# Patient Record
Sex: MALE | Race: AMERICAN INDIAN OR ALASKA NATIVE | HISPANIC OR LATINO | Employment: STUDENT
[De-identification: names, ages, dates, MRNs, and addresses within clinical notes are randomized per-mention and may not be internally consistent; named-entity substitution may affect disease eponyms.]

---
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
## 2023-10-09 ENCOUNTER — OFFICE VISIT (OUTPATIENT)
Dept: FAMILY MEDICINE | Age: 18
End: 2023-10-09

## 2023-10-09 VITALS
HEART RATE: 73 BPM | DIASTOLIC BLOOD PRESSURE: 77 MMHG | WEIGHT: 243.83 LBS | SYSTOLIC BLOOD PRESSURE: 120 MMHG | BODY MASS INDEX: 34.91 KG/M2 | HEIGHT: 70 IN | RESPIRATION RATE: 18 BRPM

## 2023-10-09 DIAGNOSIS — E06.3 HYPOTHYROIDISM DUE TO HASHIMOTO'S THYROIDITIS: Primary | ICD-10-CM

## 2023-10-09 DIAGNOSIS — E03.8 HYPOTHYROIDISM DUE TO HASHIMOTO'S THYROIDITIS: Primary | ICD-10-CM

## 2023-10-09 PROCEDURE — 99214 OFFICE O/P EST MOD 30 MIN: CPT | Performed by: FAMILY MEDICINE

## 2023-10-09 ASSESSMENT — ENCOUNTER SYMPTOMS
FEVER: 0
GASTROINTESTINAL NEGATIVE: 1
RESPIRATORY NEGATIVE: 1
CHILLS: 0

## 2023-10-09 ASSESSMENT — PATIENT HEALTH QUESTIONNAIRE - PHQ9
CLINICAL INTERPRETATION OF PHQ2 SCORE: NO FURTHER SCREENING NEEDED
1. LITTLE INTEREST OR PLEASURE IN DOING THINGS: NOT AT ALL
2. FEELING DOWN, DEPRESSED OR HOPELESS: NOT AT ALL
SUM OF ALL RESPONSES TO PHQ9 QUESTIONS 1 AND 2: 0
SUM OF ALL RESPONSES TO PHQ9 QUESTIONS 1 AND 2: 0

## 2023-11-18 ENCOUNTER — WALK IN (OUTPATIENT)
Dept: URGENT CARE | Age: 18
End: 2023-11-18

## 2023-11-18 VITALS
OXYGEN SATURATION: 98 % | TEMPERATURE: 98.3 F | SYSTOLIC BLOOD PRESSURE: 129 MMHG | RESPIRATION RATE: 19 BRPM | WEIGHT: 234 LBS | DIASTOLIC BLOOD PRESSURE: 80 MMHG | BODY MASS INDEX: 33.5 KG/M2 | HEIGHT: 70 IN | HEART RATE: 79 BPM

## 2023-11-18 DIAGNOSIS — H69.92 DYSFUNCTION OF LEFT EUSTACHIAN TUBE: Primary | ICD-10-CM

## 2023-11-18 PROCEDURE — 99213 OFFICE O/P EST LOW 20 MIN: CPT | Performed by: NURSE PRACTITIONER

## 2023-11-18 ASSESSMENT — ENCOUNTER SYMPTOMS
DIZZINESS: 1
COUGH: 0
VOMITING: 0
PHOTOPHOBIA: 0
SHORTNESS OF BREATH: 0
DOUBLE VISION: 0
ABDOMINAL PAIN: 0
BLURRED VISION: 0
DIAPHORESIS: 1
NAUSEA: 0
WHEEZING: 0
SORE THROAT: 0
FEVER: 0
CHILLS: 0
HEADACHES: 0

## 2023-11-21 ENCOUNTER — TELEPHONE (OUTPATIENT)
Dept: URGENT CARE | Age: 18
End: 2023-11-21

## 2023-12-09 ENCOUNTER — WALK IN (OUTPATIENT)
Dept: URGENT CARE | Age: 18
End: 2023-12-09

## 2023-12-09 VITALS
RESPIRATION RATE: 18 BRPM | HEIGHT: 70 IN | DIASTOLIC BLOOD PRESSURE: 84 MMHG | WEIGHT: 224.21 LBS | SYSTOLIC BLOOD PRESSURE: 134 MMHG | HEART RATE: 87 BPM | BODY MASS INDEX: 32.1 KG/M2 | TEMPERATURE: 98 F | OXYGEN SATURATION: 99 %

## 2023-12-09 DIAGNOSIS — L03.032 PARONYCHIA OF GREAT TOE, LEFT: Primary | ICD-10-CM

## 2023-12-09 PROCEDURE — 87070 CULTURE OTHR SPECIMN AEROBIC: CPT | Performed by: CLINICAL MEDICAL LABORATORY

## 2023-12-09 PROCEDURE — 99213 OFFICE O/P EST LOW 20 MIN: CPT | Performed by: NURSE PRACTITIONER

## 2023-12-09 PROCEDURE — 87205 SMEAR GRAM STAIN: CPT | Performed by: CLINICAL MEDICAL LABORATORY

## 2023-12-09 PROCEDURE — 87186 SC STD MICRODIL/AGAR DIL: CPT | Performed by: CLINICAL MEDICAL LABORATORY

## 2023-12-09 PROCEDURE — 87075 CULTR BACTERIA EXCEPT BLOOD: CPT | Performed by: CLINICAL MEDICAL LABORATORY

## 2023-12-09 PROCEDURE — 87077 CULTURE AEROBIC IDENTIFY: CPT | Performed by: CLINICAL MEDICAL LABORATORY

## 2023-12-09 RX ORDER — DOXYCYCLINE HYCLATE 100 MG
100 TABLET ORAL 2 TIMES DAILY
Qty: 14 TABLET | Refills: 0 | Status: SHIPPED | OUTPATIENT
Start: 2023-12-09 | End: 2023-12-16

## 2023-12-09 ASSESSMENT — ENCOUNTER SYMPTOMS
FEVER: 0
CONSTITUTIONAL NEGATIVE: 1
RESPIRATORY NEGATIVE: 1
FATIGUE: 0
CHILLS: 0

## 2023-12-14 ENCOUNTER — TELEPHONE (OUTPATIENT)
Dept: URGENT CARE | Age: 18
End: 2023-12-14

## 2023-12-14 LAB
BACTERIA SPEC ANAEROBE+AEROBE CULT: ABNORMAL
GRAM STN SPEC: ABNORMAL

## 2023-12-23 ENCOUNTER — LAB SERVICES (OUTPATIENT)
Dept: LAB | Age: 18
End: 2023-12-23

## 2023-12-23 DIAGNOSIS — E03.8 HYPOTHYROIDISM DUE TO HASHIMOTO'S THYROIDITIS: ICD-10-CM

## 2023-12-23 DIAGNOSIS — E06.3 HYPOTHYROIDISM DUE TO HASHIMOTO'S THYROIDITIS: ICD-10-CM

## 2023-12-23 LAB
ALBUMIN SERPL-MCNC: 3.9 G/DL (ref 3.6–5.1)
ALBUMIN/GLOB SERPL: 1.1 {RATIO} (ref 1–2.4)
ALP SERPL-CCNC: 86 UNITS/L (ref 45–117)
ALT SERPL-CCNC: 55 UNITS/L
ANION GAP SERPL CALC-SCNC: 11 MMOL/L (ref 7–19)
AST SERPL-CCNC: 38 UNITS/L
BASOPHILS # BLD: 0 K/MCL (ref 0–0.3)
BASOPHILS NFR BLD: 1 %
BILIRUB SERPL-MCNC: 1.1 MG/DL (ref 0.2–1)
BUN SERPL-MCNC: 12 MG/DL (ref 6–20)
BUN/CREAT SERPL: 16 (ref 7–25)
CALCIUM SERPL-MCNC: 9.3 MG/DL (ref 8.4–10.2)
CHLORIDE SERPL-SCNC: 107 MMOL/L (ref 97–110)
CHOLEST SERPL-MCNC: 153 MG/DL
CHOLEST/HDLC SERPL: 3.4 {RATIO}
CO2 SERPL-SCNC: 26 MMOL/L (ref 21–32)
CREAT SERPL-MCNC: 0.75 MG/DL (ref 0.67–1.17)
DEPRECATED RDW RBC: 41.6 FL (ref 39–50)
EGFRCR SERPLBLD CKD-EPI 2021: >90 ML/MIN/{1.73_M2}
EOSINOPHIL # BLD: 0.1 K/MCL (ref 0–0.5)
EOSINOPHIL NFR BLD: 1 %
ERYTHROCYTE [DISTWIDTH] IN BLOOD: 12.7 % (ref 11–15)
FASTING DURATION TIME PATIENT: 12 HOURS (ref 0–999)
GLOBULIN SER-MCNC: 3.4 G/DL (ref 2–4)
GLUCOSE SERPL-MCNC: 90 MG/DL (ref 70–99)
HBA1C MFR BLD: 4.9 % (ref 4.5–5.6)
HCT VFR BLD CALC: 50.3 % (ref 39–51)
HDLC SERPL-MCNC: 45 MG/DL
HGB BLD-MCNC: 16.8 G/DL (ref 13–17)
IMM GRANULOCYTES # BLD AUTO: 0 K/MCL (ref 0–0.2)
IMM GRANULOCYTES # BLD: 0 %
LDLC SERPL CALC-MCNC: 89 MG/DL
LYMPHOCYTES # BLD: 2.3 K/MCL (ref 1.2–5.2)
LYMPHOCYTES NFR BLD: 38 %
MCH RBC QN AUTO: 29.6 PG (ref 26–34)
MCHC RBC AUTO-ENTMCNC: 33.4 G/DL (ref 32–36.5)
MCV RBC AUTO: 88.6 FL (ref 78–100)
MONOCYTES # BLD: 0.5 K/MCL (ref 0.3–0.9)
MONOCYTES NFR BLD: 8 %
NEUTROPHILS # BLD: 3.1 K/MCL (ref 1.8–8)
NEUTROPHILS NFR BLD: 52 %
NONHDLC SERPL-MCNC: 108 MG/DL
NRBC BLD MANUAL-RTO: 0 /100 WBC
PLATELET # BLD AUTO: 328 K/MCL (ref 140–450)
POTASSIUM SERPL-SCNC: 4.1 MMOL/L (ref 3.4–5.1)
PROT SERPL-MCNC: 7.3 G/DL (ref 6.4–8.2)
RBC # BLD: 5.68 MIL/MCL (ref 4.5–5.9)
SODIUM SERPL-SCNC: 140 MMOL/L (ref 135–145)
T4 FREE SERPL-MCNC: 0.9 NG/DL (ref 0.8–1.3)
TRIGL SERPL-MCNC: 97 MG/DL
TSH SERPL-ACNC: 9.9 MCUNITS/ML (ref 0.46–4.13)
WBC # BLD: 6 K/MCL (ref 4.2–11)

## 2023-12-23 PROCEDURE — 84439 ASSAY OF FREE THYROXINE: CPT | Performed by: CLINICAL MEDICAL LABORATORY

## 2023-12-23 PROCEDURE — 80050 GENERAL HEALTH PANEL: CPT | Performed by: CLINICAL MEDICAL LABORATORY

## 2023-12-23 PROCEDURE — 36415 COLL VENOUS BLD VENIPUNCTURE: CPT | Performed by: FAMILY MEDICINE

## 2023-12-23 PROCEDURE — 80061 LIPID PANEL: CPT | Performed by: CLINICAL MEDICAL LABORATORY

## 2023-12-23 PROCEDURE — 86800 THYROGLOBULIN ANTIBODY: CPT | Performed by: CLINICAL MEDICAL LABORATORY

## 2023-12-23 PROCEDURE — 83036 HEMOGLOBIN GLYCOSYLATED A1C: CPT | Performed by: CLINICAL MEDICAL LABORATORY

## 2023-12-23 PROCEDURE — 86376 MICROSOMAL ANTIBODY EACH: CPT | Performed by: CLINICAL MEDICAL LABORATORY

## 2023-12-24 LAB
THYROGLOB AB SERPL-ACNC: 2.6 IU/ML (ref 0–4)
THYROPEROXIDASE AB SERPL-ACNC: 4932 UNITS/ML

## 2023-12-28 RX ORDER — LEVOTHYROXINE SODIUM 0.12 MG/1
125 TABLET ORAL DAILY
Qty: 30 TABLET | Refills: 0 | OUTPATIENT
Start: 2023-12-28

## 2024-01-22 ENCOUNTER — APPOINTMENT (OUTPATIENT)
Dept: FAMILY MEDICINE | Age: 19
End: 2024-01-22

## 2024-03-14 ENCOUNTER — APPOINTMENT (OUTPATIENT)
Dept: FAMILY MEDICINE | Age: 19
End: 2024-03-14

## 2024-03-23 ENCOUNTER — OFFICE VISIT (OUTPATIENT)
Dept: FAMILY MEDICINE | Age: 19
End: 2024-03-23

## 2024-03-23 VITALS
DIASTOLIC BLOOD PRESSURE: 64 MMHG | WEIGHT: 227.07 LBS | BODY MASS INDEX: 32.51 KG/M2 | HEART RATE: 68 BPM | HEIGHT: 70 IN | SYSTOLIC BLOOD PRESSURE: 111 MMHG | RESPIRATION RATE: 18 BRPM | OXYGEN SATURATION: 97 %

## 2024-03-23 DIAGNOSIS — E03.8 HYPOTHYROIDISM DUE TO HASHIMOTO'S THYROIDITIS: Primary | ICD-10-CM

## 2024-03-23 DIAGNOSIS — E06.3 HYPOTHYROIDISM DUE TO HASHIMOTO'S THYROIDITIS: Primary | ICD-10-CM

## 2024-03-23 RX ORDER — LEVOTHYROXINE SODIUM 0.07 MG/1
75 TABLET ORAL DAILY
Qty: 90 TABLET | Refills: 3 | Status: SHIPPED | OUTPATIENT
Start: 2024-03-23

## 2024-03-23 ASSESSMENT — PATIENT HEALTH QUESTIONNAIRE - PHQ9
SUM OF ALL RESPONSES TO PHQ9 QUESTIONS 1 AND 2: 0
1. LITTLE INTEREST OR PLEASURE IN DOING THINGS: NOT AT ALL
SUM OF ALL RESPONSES TO PHQ9 QUESTIONS 1 AND 2: 0
CLINICAL INTERPRETATION OF PHQ2 SCORE: NO FURTHER SCREENING NEEDED
2. FEELING DOWN, DEPRESSED OR HOPELESS: NOT AT ALL

## 2024-03-23 ASSESSMENT — ENCOUNTER SYMPTOMS: RESPIRATORY NEGATIVE: 1

## 2024-08-23 ENCOUNTER — APPOINTMENT (OUTPATIENT)
Dept: ENDOCRINOLOGY | Age: 19
End: 2024-08-23
Attending: FAMILY MEDICINE

## 2025-01-03 ENCOUNTER — HOSPITAL ENCOUNTER (OUTPATIENT)
Age: 20
Discharge: HOME | End: 2025-01-03
Payer: COMMERCIAL

## 2025-01-03 VITALS
OXYGEN SATURATION: 98 % | HEART RATE: 77 BPM | TEMPERATURE: 98 F | DIASTOLIC BLOOD PRESSURE: 82 MMHG | RESPIRATION RATE: 16 BRPM | SYSTOLIC BLOOD PRESSURE: 161 MMHG

## 2025-01-03 DIAGNOSIS — J10.1 INFLUENZA A: Primary | ICD-10-CM

## 2025-01-03 DIAGNOSIS — S69.91XA INJURY TO THUMB (NAIL), RIGHT, INITIAL ENCOUNTER: ICD-10-CM

## 2025-01-03 LAB
POCT INFLUENZA A: POSITIVE
POCT INFLUENZA B: NEGATIVE
S PYO AG THROAT QL: NEGATIVE
SARS-COV-2 RNA RESP QL NAA+PROBE: NOT DETECTED

## 2025-01-03 PROCEDURE — 87880 STREP A ASSAY W/OPTIC: CPT | Performed by: PHYSICIAN ASSISTANT

## 2025-01-03 PROCEDURE — 99214 OFFICE O/P EST MOD 30 MIN: CPT | Performed by: PHYSICIAN ASSISTANT

## 2025-01-03 PROCEDURE — 87502 INFLUENZA DNA AMP PROBE: CPT | Performed by: PHYSICIAN ASSISTANT

## 2025-01-03 PROCEDURE — U0002 COVID-19 LAB TEST NON-CDC: HCPCS | Performed by: PHYSICIAN ASSISTANT

## 2025-01-03 RX ORDER — BENZONATATE 200 MG/1
200 CAPSULE ORAL 3 TIMES DAILY PRN
Qty: 20 CAPSULE | Refills: 0 | Status: SHIPPED | OUTPATIENT
Start: 2025-01-03

## 2025-01-03 RX ORDER — DEXTROMETHORPHAN HBR, GUAIFENESIN 60; 1200 MG/1; MG/1
1 TABLET, EXTENDED RELEASE ORAL EVERY 12 HOURS
Qty: 12 TABLET | Refills: 0 | Status: SHIPPED | OUTPATIENT
Start: 2025-01-03

## 2025-01-04 NOTE — ED PROVIDER NOTES
Chief Complaint   Patient presents with    Cough       History obtained from: patient   services not used    HPI:     Johnathan Reyes is a 19 year old male who presents with general illness symptoms x 3 days.  Patient endorses cough, runny nose, and sore throat.  Patient states coworkers recently sick with similar symptoms.  Patient taking OTC cold medicine.  Patient continues to eat and drink normally.  Denies fever, chills, facial or neck swelling, drooling, voice change, headache, ear pain, chest pain, shortness of breath, vomiting, diarrhea, neck stiffness, rash.    Patient also endorses right thumb injury sustained 3 weeks ago.  Patient states his thumb was smashed with a wrench at work approximately 3 weeks ago.  Patient did not get evaluated following this injury.  Patient states his nail split and is starting to get caught on things and bother him.  Denies swelling, deformity, decreased range of motion, redness, drainage, bleeding.    PMH  Past Medical History:    Asthma ([facility])       PFSH    PFSH asessment screens reviewed and agree.  Nurses notes reviewed I agree with documentation.    No family history on file.  Family history reviewed with patient/caregiver and is not pertinent to presenting problem.  Social History     Socioeconomic History    Marital status: Single     Spouse name: Not on file    Number of children: Not on file    Years of education: Not on file    Highest education level: Not on file   Occupational History    Not on file   Tobacco Use    Smoking status: Never    Smokeless tobacco: Never   Substance and Sexual Activity    Alcohol use: Never    Drug use: Never    Sexual activity: Not on file   Other Topics Concern    Not on file   Social History Narrative    Not on file     Social Drivers of Health     Financial Resource Strain: Not on file   Food Insecurity: Not on file   Transportation Needs: Not on file   Physical Activity: Not on file   Stress: Not on file   Social  Connections: Not on file   Housing Stability: Not on file         ROS:   Positive for stated complaint: Cough, congestion, sore throat, right thumb injury  All other systems reviewed and negative except as noted above.  Constitutional and Vital Signs Reviewed.    Physical Exam:     Findings:    BP (!) 161/82   Pulse 77   Temp 98.2 °F (36.8 °C) (Oral)   Resp 16   SpO2 98%   GENERAL: well developed, no acute distress, non-toxic appearing   SKIN: good skin turgor, no obvious rashes  HEAD: normocephalic, atraumatic  EYES: sclera non-icteric bilaterally, conjunctiva clear bilaterally  EARS: canals clear bilaterally, TMs clear bilaterally  NOSE: nasal congestion  OROPHARYNX: MMM, pharynx clear, no exudates or swelling, uvula midline, no tongue elevation, maintaining airway and secretions  NECK: supple, no lymphadenopathy, no nuchal rigidity, no trismus, no edema, phonation normal    CARDIO: RRR, normal heart sounds, radial pulse 2+ bilaterally, cap refill less than 2 seconds  LUNGS: clear to auscultation bilaterally, no increased WOB, no rales, rhonchi, or wheezes  EXTREMITIES: Partial avulsion to the lateral aspect of the right thumb nail, remainder of nail remains intact, small subungual hematoma, no evidence of nailbed laceration, no bleeding, no drainage, no surrounding erythema, minimally tender over nail, no bony tenderness, F ROM, compartments soft, CMS intact  NEURO: no focal deficits  PSYCH: alert and oriented x3, answering questions appropriately, mood appropriate    MDM/Assessment/Plan:   Orders for this encounter:    Orders Placed This Encounter    POCT Rapid Strep    Rapid SARS-CoV-2 by PCR     Order Specific Question:   Release to patient     Answer:   Immediate    POCT Flu Test     Order Specific Question:   Release to patient     Answer:   Immediate    Grp A Strep Cult, Throat    POCT Rapid Strep    benzonatate 200 MG Oral Cap     Sig: Take 1 capsule (200 mg total) by mouth 3 (three) times daily as  needed for cough.     Dispense:  20 capsule     Refill:  0    dextromethorphan-guaifenesin ER (MUCINEX DM MAXIMUM STRENGTH)  MG Oral Tablet 12 Hr     Sig: Take 1 tablet by mouth every 12 (twelve) hours.     Dispense:  12 tablet     Refill:  0       Labs performed this visit:  Recent Results (from the past 10 hours)   Rapid SARS-CoV-2 by PCR    Collection Time: 01/03/25  7:08 PM    Specimen: Nares; Other   Result Value Ref Range    Rapid SARS-CoV-2 by PCR Not Detected Not Detected   POCT Flu Test    Collection Time: 01/03/25  7:08 PM    Specimen: Nares; Other   Result Value Ref Range    POCT INFLUENZA A Positive (A) Negative    POCT INFLUENZA B Negative Negative   POCT Rapid Strep    Collection Time: 01/03/25  7:19 PM   Result Value Ref Range    POCT Rapid Strep Negative Negative       Imaging performed this visit:  No orders to display       Medical Decision Making  DDx for general illness symptoms includes viral URI versus flu versus covid versus strep pharyngitis versus other. Patient is overall very well-appearing with elevated blood pressure reading and otherwise stable vitals and tolerating oral intake. No hypoxia or signs of respiratory distress.  Influenza A positive.  COVID and strep negative.  Discussed diagnosis and prognosis.  Discussed supportive care for the flu including rest, increased fluid intake, and OTC Tylenol/Motrin as needed for pain or fevers.  Rx Tessalon and Mucinex for symptom management.  Discussed infection control.     DDx for right thumb injury includes nail avulsion versus fracture versus sprain versus contusion versus other.  Injury sustained 3 weeks ago.  No signs of neurovascular compromise or compartment syndrome.  No signs of infection.  No bony tenderness.  Offered x-ray which patient declines.  Given that most of the nail remains intact, recommend leaving nail intact to protect nailbed.  Nail irrigated and dressing applied.  Recommend patient follow-up with hand  specialist.    Instructed patient to go directly to nearest ER with any worsening or concerning symptoms.  Follow-up with PCP.    Amount and/or Complexity of Data Reviewed  Labs: ordered.    Risk  OTC drugs.  Prescription drug management.          Diagnosis:    ICD-10-CM    1. Influenza A  J10.1       2. Injury to thumb (nail), right, initial encounter  S69.91XA           All results reviewed and discussed with patient/patient's family. Patient/patient's family verbalize excellent understanding of instructions and feels comfortable with plan. All of patient's/patient's family's questions were addressed.   See AVS for detailed discharge instructions for your condition today.    Follow Up with:  Juan Moralez MD  [facility] [place] [id number] [phone]      Hand specialist      Note: This document was dictated using Dragon medical dictation software.  Proofreading was performed to the best of my ability, but errors may be present.    Bessy Burk PA-C

## 2025-01-04 NOTE — DISCHARGE INSTRUCTIONS
Take medicine as prescribed for symptoms     Alternate Tylenol/Motrin every 3 hours as needed for pain or fever > 100.4 degrees   Drink plenty of fluids  Get plenty of rest    Wash hands often  Disinfect your environment  Do not share utensils or drinks    You may benefit from taking a decongestant (e.g. Sudafed)  You may benefit from taking a daily allergy medication (e.g. Zyrtec)  You may benefit from using a humidifier    Stay home and isolate from others in your home until you are fever-free (<100.4) for 24 hours and your symptoms are improving    Symptoms may take a few weeks to resolve  Follow up with your primary care provider    Keep nail covered when working to protect nailbed   Keep skin and nail clean   Follow up with hand specialist

## 2025-01-11 ENCOUNTER — WALK IN (OUTPATIENT)
Dept: URGENT CARE | Age: 20
End: 2025-01-11

## 2025-01-11 VITALS
HEART RATE: 72 BPM | SYSTOLIC BLOOD PRESSURE: 113 MMHG | TEMPERATURE: 98.2 F | OXYGEN SATURATION: 98 % | DIASTOLIC BLOOD PRESSURE: 48 MMHG | RESPIRATION RATE: 18 BRPM

## 2025-01-11 DIAGNOSIS — L03.031 PARONYCHIA OF GREAT TOE OF RIGHT FOOT: Primary | ICD-10-CM

## 2025-01-11 RX ORDER — DOXYCYCLINE HYCLATE 100 MG
100 TABLET ORAL 2 TIMES DAILY
Qty: 14 TABLET | Refills: 0 | Status: SHIPPED | OUTPATIENT
Start: 2025-01-11 | End: 2025-01-18

## 2025-01-11 RX ORDER — MUPIROCIN 20 MG/G
OINTMENT TOPICAL 3 TIMES DAILY
Qty: 22 G | Refills: 0 | Status: SHIPPED | OUTPATIENT
Start: 2025-01-11 | End: 2025-01-21

## 2025-01-11 ASSESSMENT — PAIN SCALES - GENERAL: PAINLEVEL: 7

## 2025-01-12 ASSESSMENT — ENCOUNTER SYMPTOMS
HEADACHES: 0
CHILLS: 0
WHEEZING: 0
CHEST TIGHTNESS: 0
ACTIVITY CHANGE: 0
COLOR CHANGE: 0
COUGH: 0
DIARRHEA: 0
FATIGUE: 0
CONSTIPATION: 0
VOMITING: 0
NAUSEA: 0
FEVER: 0
APNEA: 0
SHORTNESS OF BREATH: 0
DIZZINESS: 0

## 2025-01-12 ASSESSMENT — VISUAL ACUITY: OU: 1

## 2025-08-21 DIAGNOSIS — E06.3 HYPOTHYROIDISM DUE TO HASHIMOTO'S THYROIDITIS: ICD-10-CM

## 2025-08-21 RX ORDER — LEVOTHYROXINE SODIUM 75 UG/1
75 TABLET ORAL DAILY
Qty: 90 TABLET | Refills: 0 | Status: SHIPPED | OUTPATIENT
Start: 2025-08-21

## (undated) NOTE — LETTER
Date & Time: 1/3/2025, 7:39 PM  Patient: Johnathan Reyes  Encounter Provider(s):    Bessy Burk PA-C       To Whom It May Concern:    Johnathan Reyes was seen and treated in our department on 1/3/2025. He can return to work 1/7/2025.    If you have any questions or concerns, please do not hesitate to call.        _____________________________  Physician/APC Signature

## (undated) NOTE — LETTER
Date & Time: 4/7/2022, 12:39 PM  Patient: Odessa Abebe  Encounter Provider(s):    AUNDREA Cummings       To Whom It May Concern:    Odessa Abebe was seen and treated in our department on 4/7/2022. He should not return to work until 04/08/2022. If you have any questions or concerns, please do not hesitate to call.       MALACHI Hubbard  _____________________________  [id number]/MCQ Signature